# Patient Record
Sex: MALE | Race: WHITE | Employment: OTHER | URBAN - METROPOLITAN AREA
[De-identification: names, ages, dates, MRNs, and addresses within clinical notes are randomized per-mention and may not be internally consistent; named-entity substitution may affect disease eponyms.]

---

## 2017-08-29 ENCOUNTER — TELEPHONE (OUTPATIENT)
Dept: PHARMACY | Age: 82
End: 2017-08-29

## 2017-08-31 ENCOUNTER — HOSPITAL ENCOUNTER (OUTPATIENT)
Age: 82
Discharge: HOME | End: 2017-08-31
Payer: COMMERCIAL

## 2017-08-31 LAB
ABSOLUTE EOS #: 0.2 K/UL (ref 0–0.4)
ABSOLUTE LYMPH #: 1.4 K/UL (ref 1–4.8)
ABSOLUTE MONO #: 0.3 K/UL (ref 0.2–0.8)
ALBUMIN SERPL-MCNC: 3.6 G/DL (ref 3.5–5.2)
ANION GAP SERPL CALCULATED.3IONS-SCNC: 10 MMOL/L (ref 9–17)
BASOPHILS # BLD: 0 %
BASOPHILS ABSOLUTE: 0 K/UL (ref 0–0.2)
BILIRUBIN URINE: NEGATIVE
BUN BLDV-MCNC: 39 MG/DL (ref 8–23)
CALCIUM IONIZED: 1.26 MMOL/L (ref 1.13–1.33)
CALCIUM SERPL-MCNC: 9 MG/DL (ref 8.6–10.4)
CHLORIDE BLD-SCNC: 104 MMOL/L (ref 98–107)
CO2: 28 MMOL/L (ref 20–31)
COLOR: YELLOW
COMMENT UA: NORMAL
CREAT SERPL-MCNC: 1.93 MG/DL (ref 0.7–1.2)
CREATININE URINE: 71.1 MG/DL (ref 39–259)
DIFFERENTIAL TYPE: ABNORMAL
EOSINOPHILS RELATIVE PERCENT: 3 %
FERRITIN: 457 UG/L (ref 30–400)
GFR AFRICAN AMERICAN: 40 ML/MIN
GFR NON-AFRICAN AMERICAN: 33 ML/MIN
GFR SERPL CREATININE-BSD FRML MDRD: ABNORMAL ML/MIN/{1.73_M2}
GFR SERPL CREATININE-BSD FRML MDRD: ABNORMAL ML/MIN/{1.73_M2}
GLUCOSE URINE: NEGATIVE
HCT VFR BLD CALC: 32.8 % (ref 41–53)
HCT VFR BLD CALC: 33.1 % (ref 41–53)
HEMOGLOBIN: 10.8 G/DL (ref 13.5–17.5)
HEMOGLOBIN: 11 G/DL (ref 13.5–17.5)
IRON SATURATION: 30 % (ref 20–55)
IRON SATURATION: 30 % (ref 20–55)
IRON: 55 UG/DL (ref 59–158)
IRON: 55 UG/DL (ref 59–158)
KETONES, URINE: NEGATIVE
LEUKOCYTE ESTERASE, URINE: NEGATIVE
LYMPHOCYTES # BLD: 25 %
MAGNESIUM: 1.8 MG/DL (ref 1.6–2.6)
MCH RBC QN AUTO: 30.4 PG (ref 26–34)
MCHC RBC AUTO-ENTMCNC: 32.8 G/DL (ref 31–37)
MCV RBC AUTO: 92.9 FL (ref 80–100)
MONOCYTES # BLD: 6 %
NITRITE, URINE: NEGATIVE
PDW BLD-RTO: 17.8 % (ref 11.5–14.5)
PH UA: 5.5 (ref 5–8)
PHOSPHORUS: 3.2 MG/DL (ref 2.5–4.5)
PLATELET # BLD: 138 K/UL (ref 130–400)
PLATELET ESTIMATE: ABNORMAL
PMV BLD AUTO: ABNORMAL FL (ref 6–12)
POTASSIUM SERPL-SCNC: 5.2 MMOL/L (ref 3.7–5.3)
PROTEIN UA: NEGATIVE
PTH INTACT: 51.38 PG/ML (ref 15–65)
RBC # BLD: 3.56 M/UL (ref 4.5–5.9)
RBC # BLD: ABNORMAL 10*6/UL
SEG NEUTROPHILS: 66 %
SEGMENTED NEUTROPHILS ABSOLUTE COUNT: 3.9 K/UL (ref 1.8–7.7)
SODIUM BLD-SCNC: 142 MMOL/L (ref 135–144)
SPECIFIC GRAVITY UA: 1.01 (ref 1–1.03)
T3 TOTAL: 88 NG/DL (ref 80–200)
T4 TOTAL: 4.4 UG/DL (ref 4.5–12)
TOTAL IRON BINDING CAPACITY: 183 UG/DL (ref 250–450)
TOTAL IRON BINDING CAPACITY: 183 UG/DL (ref 250–450)
TOTAL PROTEIN, URINE: 13 MG/DL
TSH SERPL DL<=0.05 MIU/L-ACNC: 4.87 MIU/L (ref 0.3–5)
TURBIDITY: CLEAR
UNSATURATED IRON BINDING CAPACITY: 128 UG/DL (ref 112–347)
UNSATURATED IRON BINDING CAPACITY: 128 UG/DL (ref 112–347)
URINE HGB: NEGATIVE
UROBILINOGEN, URINE: NORMAL
VITAMIN D 25-HYDROXY: 19.5 NG/ML (ref 30–100)
WBC # BLD: 5.8 K/UL (ref 3.5–11)
WBC # BLD: ABNORMAL 10*3/UL

## 2017-08-31 PROCEDURE — 82330 ASSAY OF CALCIUM: CPT

## 2017-08-31 PROCEDURE — 82728 ASSAY OF FERRITIN: CPT

## 2017-08-31 PROCEDURE — 83540 ASSAY OF IRON: CPT

## 2017-08-31 PROCEDURE — 36415 COLL VENOUS BLD VENIPUNCTURE: CPT

## 2017-08-31 PROCEDURE — 84480 ASSAY TRIIODOTHYRONINE (T3): CPT

## 2017-08-31 PROCEDURE — 84443 ASSAY THYROID STIM HORMONE: CPT

## 2017-08-31 PROCEDURE — 82040 ASSAY OF SERUM ALBUMIN: CPT

## 2017-08-31 PROCEDURE — 82306 VITAMIN D 25 HYDROXY: CPT

## 2017-08-31 PROCEDURE — 85025 COMPLETE CBC W/AUTO DIFF WBC: CPT

## 2017-08-31 PROCEDURE — 82565 ASSAY OF CREATININE: CPT

## 2017-08-31 PROCEDURE — 84100 ASSAY OF PHOSPHORUS: CPT

## 2017-08-31 PROCEDURE — 80051 ELECTROLYTE PANEL: CPT

## 2017-08-31 PROCEDURE — 83735 ASSAY OF MAGNESIUM: CPT

## 2017-08-31 PROCEDURE — 82310 ASSAY OF CALCIUM: CPT

## 2017-08-31 PROCEDURE — 85014 HEMATOCRIT: CPT

## 2017-08-31 PROCEDURE — 84520 ASSAY OF UREA NITROGEN: CPT

## 2017-08-31 PROCEDURE — 83550 IRON BINDING TEST: CPT

## 2017-08-31 PROCEDURE — 82570 ASSAY OF URINE CREATININE: CPT

## 2017-08-31 PROCEDURE — 81003 URINALYSIS AUTO W/O SCOPE: CPT

## 2017-08-31 PROCEDURE — 85018 HEMOGLOBIN: CPT

## 2017-08-31 PROCEDURE — 84156 ASSAY OF PROTEIN URINE: CPT

## 2017-08-31 PROCEDURE — 84436 ASSAY OF TOTAL THYROXINE: CPT

## 2017-08-31 PROCEDURE — 83970 ASSAY OF PARATHORMONE: CPT

## 2017-09-06 LAB — INR BLD: 1.9

## 2017-09-20 ENCOUNTER — TELEPHONE (OUTPATIENT)
Dept: PHARMACY | Age: 82
End: 2017-09-20

## 2017-09-28 ENCOUNTER — HOSPITAL ENCOUNTER (OUTPATIENT)
Dept: PHARMACY | Age: 82
Setting detail: THERAPIES SERIES
Discharge: HOME | End: 2017-09-28
Payer: COMMERCIAL

## 2017-09-28 DIAGNOSIS — I48.91 ATRIAL FIBRILLATION, UNSPECIFIED TYPE (HCC): ICD-10-CM

## 2017-09-28 DIAGNOSIS — Z95.2 MECHANICAL HEART VALVE PRESENT: ICD-10-CM

## 2017-09-28 PROBLEM — Z95.1 HISTORY OF CORONARY ARTERY BYPASS SURGERY: Status: ACTIVE | Noted: 2017-09-05

## 2017-09-28 PROBLEM — I21.3 ST ELEVATION MYOCARDIAL INFARCTION (STEMI) (HCC): Status: ACTIVE | Noted: 2017-08-01

## 2017-09-28 LAB
INR BLD: 1.5
PROTIME: 18.1 SECONDS

## 2017-09-28 PROCEDURE — 85610 PROTHROMBIN TIME: CPT

## 2017-09-28 PROCEDURE — 99211 OFF/OP EST MAY X REQ PHY/QHP: CPT

## 2017-09-28 RX ORDER — ACETAMINOPHEN 325 MG/1
650 TABLET ORAL EVERY 4 HOURS PRN
COMMUNITY

## 2017-09-28 RX ORDER — M-VIT,TX,IRON,MINS/CALC/FOLIC 27MG-0.4MG
1 TABLET ORAL DAILY
COMMUNITY

## 2017-09-28 RX ORDER — TAMSULOSIN HYDROCHLORIDE 0.4 MG/1
0.4 CAPSULE ORAL DAILY
COMMUNITY

## 2017-09-28 RX ORDER — SIMVASTATIN 40 MG
40 TABLET ORAL NIGHTLY
COMMUNITY

## 2017-09-28 RX ORDER — POTASSIUM CHLORIDE 750 MG/1
20 TABLET, FILM COATED, EXTENDED RELEASE ORAL DAILY
COMMUNITY

## 2017-09-28 RX ORDER — METOPROLOL SUCCINATE 25 MG/1
25 TABLET, EXTENDED RELEASE ORAL DAILY
COMMUNITY

## 2017-09-28 RX ORDER — ALLOPURINOL 300 MG/1
300 TABLET ORAL DAILY
COMMUNITY

## 2017-09-28 RX ORDER — OMEPRAZOLE 20 MG/1
20 CAPSULE, DELAYED RELEASE ORAL DAILY
COMMUNITY

## 2017-09-28 RX ORDER — WARFARIN SODIUM 3 MG/1
3-4.5 TABLET ORAL SEE ADMIN INSTRUCTIONS
COMMUNITY
End: 2017-11-08 | Stop reason: SDUPTHER

## 2017-09-28 RX ORDER — BUMETANIDE 0.5 MG/1
0.5 TABLET ORAL DAILY
COMMUNITY

## 2017-09-28 RX ORDER — MIRTAZAPINE 15 MG/1
15 TABLET, FILM COATED ORAL NIGHTLY
COMMUNITY

## 2017-10-05 ENCOUNTER — HOSPITAL ENCOUNTER (OUTPATIENT)
Dept: PHARMACY | Age: 82
Setting detail: THERAPIES SERIES
Discharge: HOME | End: 2017-10-05
Payer: COMMERCIAL

## 2017-10-05 LAB
INR BLD: 1.2
PROTIME: 14.8 SECONDS

## 2017-10-05 PROCEDURE — 99211 OFF/OP EST MAY X REQ PHY/QHP: CPT

## 2017-10-05 PROCEDURE — 85610 PROTHROMBIN TIME: CPT

## 2017-10-05 NOTE — IP AVS SNAPSHOT
After Visit Summary             Ysabel Dave   10/5/2017  9:00 AM   Anti-Coag    Description:  Male : 1933   Provider:  Olga ENRIQUE   Department:  [facility]ication [facility]              Your Follow-Up and Future Appointments         Below is a list of your follow-up and future appointments. This may not be a complete list as you may have made appointments directly with providers that we are not aware of or your providers may have made some for you. Please call your providers to confirm appointments. It is important to keep your appointments. Please bring your current insurance card, photo ID, co-pay, and all medication bottles to your appointment. If self-pay, payment is expected at the time of service. Your To-Do List     Future Appointments Provider Department Dept Phone    10/12/2017 9:45 AM [address] Medication Management [phone]         Information from Your Visit        Department     Name Address Phone Fax    [facility] [address] [facility] [address] [phone]      Anticoagulation Summary as of 10/5/2017              Today's INR 1. 2!     Next INR check 10/12/2017         Medications and Orders      Your Current Medications Are              acetaminophen (TYLENOL) 325 MG tablet Take 650 mg by mouth every 4 hours as needed for Pain or Fever    allopurinol (ZYLOPRIM) 300 MG tablet Take 300 mg by mouth daily    aspirin 81 MG tablet Take 81 mg by mouth daily    bumetanide (BUMEX) 0.5 MG tablet Take 0.5 mg by mouth daily    warfarin (COUMADIN) 3 MG tablet Take 3-4.5 mg by mouth See Admin Instructions Indications: pt takes 4.5 mg on Mon/Th and 3 mg all other days    tamsulosin (FLOMAX) 0.4 MG capsule Take 0.4 mg by mouth daily    Nutritional Supplements (BELKYS NUTRIVIGOR PO) Take 1 packet by mouth 2 times daily 29               30               31                    Date Details   10/05 This INR check       Date of next INR:  10/12/2017         How to take your warfarin dose              To take:  3 mg Take 1 of the 3 mg tablets. To take:  4.5 mg Take 1.5 of the 3 mg tablets.

## 2017-10-12 ENCOUNTER — HOSPITAL ENCOUNTER (OUTPATIENT)
Dept: PHARMACY | Age: 82
Setting detail: THERAPIES SERIES
Discharge: HOME | End: 2017-10-12
Payer: COMMERCIAL

## 2017-10-12 PROBLEM — Z95.3 BIOPROSTHETIC AORTIC VALVE REPLACEMENT DURING CURRENT HOSPITALIZATION: Status: ACTIVE | Noted: 2017-10-12

## 2017-10-12 LAB
INR BLD: 1.2
PROTIME: 14.2 SECONDS

## 2017-10-12 PROCEDURE — 85610 PROTHROMBIN TIME: CPT

## 2017-10-12 PROCEDURE — 99211 OFF/OP EST MAY X REQ PHY/QHP: CPT

## 2017-10-12 NOTE — PROGRESS NOTES
Patient states he is taking his medications in his med packs but cannot tell me any more specific information. He is alone at this visit; no family/caregiver present to provide any additional information. No bleeding or thromboembolic side effects noted. No significant dietary changes. No significant recent illness or disease state changes. PT/INR done in office per protocol. INR is 1.2 which is subtherapeutic and the same as last week's visit despite the noted increase in regimen which makes me question whether he is taking this medication as instructed. Tried calling daughter and spouse in an attempt to gain more information, but I had to leave messages at both numbers. Warfarin regimen will be increased to 6mg Tues/Thurs/Sat, 3mg all other days. Will retest in 6 days. I tried to explain dosing information and directions, but I don't think he understands. Dosing schedule and follow up appointment given to patient; also left dosing information in my message with the daughter as he requested. Progress note routed to referring physicians office.

## 2017-10-18 ENCOUNTER — HOSPITAL ENCOUNTER (OUTPATIENT)
Dept: PHARMACY | Age: 82
Setting detail: THERAPIES SERIES
Discharge: HOME | End: 2017-10-18
Payer: COMMERCIAL

## 2017-10-18 LAB
INR BLD: 1.2
PROTIME: 14.7 SECONDS

## 2017-10-18 PROCEDURE — 99211 OFF/OP EST MAY X REQ PHY/QHP: CPT

## 2017-10-18 PROCEDURE — 85610 PROTHROMBIN TIME: CPT

## 2017-10-18 NOTE — PROGRESS NOTES
Patient states he is taking his pills in his medication pack. He was asked by the clinic to bring in his medication packs today, but he brought in only a partial pack from home--it only contained the bedtime doses from 10/5 through 10/10. Warfarin was still in this pack so it appears he did not take his warfarin during that week, which would explain the low INR at last visit. His warfarin was previously managed at [facility] clinic where he was therapeutic at 3mg daily when his wife was taking care of his medications. He no longer lives with her, and he does not seem to be able to take care of his meds himself. He is currently living at a friend's house [place], [place] [phone]) and his sister Jessica Landrum lives next door ( [phone]). He does not have a cell phone a this time, so he can only be contacted through his friend or sister. No bleeding or thromboembolic side effects noted. No significant med or dietary changes. No significant recent illness or disease state changes. PT/INR done in office per protocol. INR is 1.2 which is subtherapeutic as I suspect he is not taking his warfarin. I contacted [facility] where he gets his medications packaged, and they state they are delivering a new med pack today but will leave his warfarin in a vial for us to insert into the packs. I left a message with his roommate to have him bring his med packs and warfarin vial back to the clinic today or tomorrow so that we can insert the correct warfarin dose. Warfarin regimen will be continued with 6mg x 2, then 3mg daily. Will retest in 1 week. Dosing schedule and follow up appointment given to patient. Progress note routed to referring physicians office.

## 2017-10-23 ENCOUNTER — TELEPHONE (OUTPATIENT)
Dept: PHARMACY | Age: 82
End: 2017-10-23

## 2017-10-23 NOTE — TELEPHONE ENCOUNTER
Pt did not come to clinic last Thursday as requested so that we could put his correct warfarin dose into his medication bubblepack.  Called pt, spoke with his friend/roommate Tico Burch who will tell him to come in tomorrow at 8am.

## 2017-10-24 ENCOUNTER — HOSPITAL ENCOUNTER (OUTPATIENT)
Dept: PHARMACY | Age: 82
Setting detail: THERAPIES SERIES
Discharge: HOME | End: 2017-10-24
Payer: COMMERCIAL

## 2017-10-24 DIAGNOSIS — Z95.3 BIOPROSTHETIC AORTIC VALVE REPLACEMENT DURING CURRENT HOSPITALIZATION: ICD-10-CM

## 2017-10-24 DIAGNOSIS — I48.91 ATRIAL FIBRILLATION, UNSPECIFIED TYPE (HCC): ICD-10-CM

## 2017-10-24 LAB
INR BLD: 1.2
PROTIME: 14 SECONDS

## 2017-10-24 PROCEDURE — 85610 PROTHROMBIN TIME: CPT

## 2017-10-24 PROCEDURE — 99211 OFF/OP EST MAY X REQ PHY/QHP: CPT

## 2017-10-25 ENCOUNTER — APPOINTMENT (OUTPATIENT)
Dept: PHARMACY | Age: 82
End: 2017-10-25
Payer: COMMERCIAL

## 2017-10-30 ENCOUNTER — APPOINTMENT (OUTPATIENT)
Dept: PHARMACY | Age: 82
End: 2017-10-30
Payer: COMMERCIAL

## 2017-10-31 ENCOUNTER — HOSPITAL ENCOUNTER (OUTPATIENT)
Dept: PHARMACY | Age: 82
Setting detail: THERAPIES SERIES
Discharge: HOME | End: 2017-10-31
Payer: COMMERCIAL

## 2017-10-31 DIAGNOSIS — Z95.3 BIOPROSTHETIC AORTIC VALVE REPLACEMENT DURING CURRENT HOSPITALIZATION: ICD-10-CM

## 2017-10-31 LAB
INR BLD: 1.6
PROTIME: 19.7 SECONDS

## 2017-10-31 PROCEDURE — 85610 PROTHROMBIN TIME: CPT

## 2017-10-31 PROCEDURE — 99211 OFF/OP EST MAY X REQ PHY/QHP: CPT

## 2017-10-31 NOTE — PROGRESS NOTES
Patient states compliant all of the time with regimen. No bleeding or thromboembolic side effects noted. No significant med or dietary changes. No significant recent illness or disease state changes. PT/INR done in office per protocol. INR is 1.6 which is subtherapeutic. Warfarin regimen will be increased to 6mg x 1 then continued at 3mg daily. Will retest in 1 week. Patent's med packs from [facility] were refilled with warfarin thru 11/7 at bedtime. Patient understands dosing directions and information discussed. Dosing schedule and follow up appointment given to patient. Progress note routed to referring physicians office.

## 2017-11-08 ENCOUNTER — HOSPITAL ENCOUNTER (OUTPATIENT)
Dept: PHARMACY | Age: 82
Setting detail: THERAPIES SERIES
Discharge: HOME | End: 2017-11-08
Payer: COMMERCIAL

## 2017-11-08 DIAGNOSIS — Z95.3 BIOPROSTHETIC AORTIC VALVE REPLACEMENT DURING CURRENT HOSPITALIZATION: ICD-10-CM

## 2017-11-08 DIAGNOSIS — I48.91 ATRIAL FIBRILLATION, UNSPECIFIED TYPE (HCC): ICD-10-CM

## 2017-11-08 LAB
INR BLD: 1.5
PROTIME: 18.6 SECONDS

## 2017-11-08 PROCEDURE — 85610 PROTHROMBIN TIME: CPT

## 2017-11-08 PROCEDURE — 99211 OFF/OP EST MAY X REQ PHY/QHP: CPT

## 2017-11-08 RX ORDER — WARFARIN SODIUM 3 MG/1
3-6 TABLET ORAL DAILY
Qty: 60 TABLET | Refills: 1 | OUTPATIENT
Start: 2017-11-08 | End: 2018-03-07 | Stop reason: SDUPTHER

## 2017-11-13 ENCOUNTER — HOSPITAL ENCOUNTER (OUTPATIENT)
Dept: PHARMACY | Age: 82
Setting detail: THERAPIES SERIES
Discharge: HOME | End: 2017-11-13
Payer: COMMERCIAL

## 2017-11-13 DIAGNOSIS — I48.91 ATRIAL FIBRILLATION, UNSPECIFIED TYPE (HCC): ICD-10-CM

## 2017-11-13 DIAGNOSIS — Z95.3 BIOPROSTHETIC AORTIC VALVE REPLACEMENT DURING CURRENT HOSPITALIZATION: ICD-10-CM

## 2017-11-13 LAB
INR BLD: 3.2
PROTIME: 38.9 SECONDS

## 2017-11-13 PROCEDURE — 99211 OFF/OP EST MAY X REQ PHY/QHP: CPT

## 2017-11-13 PROCEDURE — 85610 PROTHROMBIN TIME: CPT

## 2017-11-13 NOTE — PROGRESS NOTES
Patient states he took all the pills in his pill packs. Patient DID NOT bring new pill packs for us to put warfarin in. No bleeding or thromboembolic side effects noted. No significant med or dietary changes. No significant recent illness or disease state changes. PT/INR done in office per protocol. INR is 3.2 which is supratherapeutic. Warfarin regimen will be continued with 3mg daily. Patient reminded to bring pill packs to next visit to help with compliance. Gave dose card and calendar print out to help today. Will retest in 1 week. Patient understands dosing directions and information discussed. Dosing schedule and follow up appointment given to patient. Progress note routed to referring physicians office.

## 2017-11-20 ENCOUNTER — HOSPITAL ENCOUNTER (OUTPATIENT)
Dept: PHARMACY | Age: 82
Setting detail: THERAPIES SERIES
Discharge: HOME | End: 2017-11-20
Payer: COMMERCIAL

## 2017-11-20 DIAGNOSIS — Z95.3 BIOPROSTHETIC AORTIC VALVE REPLACEMENT DURING CURRENT HOSPITALIZATION: ICD-10-CM

## 2017-11-20 DIAGNOSIS — I48.91 ATRIAL FIBRILLATION, UNSPECIFIED TYPE (HCC): ICD-10-CM

## 2017-11-20 LAB
INR BLD: 1.9
PROTIME: 22.4 SECONDS

## 2017-11-20 PROCEDURE — 85610 PROTHROMBIN TIME: CPT

## 2017-11-20 PROCEDURE — 99211 OFF/OP EST MAY X REQ PHY/QHP: CPT

## 2017-11-21 ENCOUNTER — TELEPHONE (OUTPATIENT)
Dept: PHARMACY | Age: 82
End: 2017-11-21

## 2017-11-21 DIAGNOSIS — I48.91 ATRIAL FIBRILLATION, UNSPECIFIED TYPE (HCC): ICD-10-CM

## 2017-11-21 DIAGNOSIS — Z95.3 BIOPROSTHETIC AORTIC VALVE REPLACEMENT DURING CURRENT HOSPITALIZATION: ICD-10-CM

## 2017-11-27 ENCOUNTER — HOSPITAL ENCOUNTER (OUTPATIENT)
Dept: PHARMACY | Age: 82
Setting detail: THERAPIES SERIES
Discharge: HOME | End: 2017-11-27
Payer: COMMERCIAL

## 2017-11-27 DIAGNOSIS — I48.91 ATRIAL FIBRILLATION, UNSPECIFIED TYPE (HCC): ICD-10-CM

## 2017-11-27 DIAGNOSIS — Z95.3 BIOPROSTHETIC AORTIC VALVE REPLACEMENT DURING CURRENT HOSPITALIZATION: ICD-10-CM

## 2017-11-27 LAB
INR BLD: 1.3
PROTIME: 15 SECONDS

## 2017-11-27 PROCEDURE — 99211 OFF/OP EST MAY X REQ PHY/QHP: CPT

## 2017-11-27 PROCEDURE — 85610 PROTHROMBIN TIME: CPT

## 2017-11-27 NOTE — PROGRESS NOTES
Patient states he took only 3mg daily. No bleeding or thromboembolic side effects noted. No significant med or dietary changes. No significant recent illness or disease state changes. PT/INR done in office per protocol. INR is 1.3 which is subtherapeutic likely from compliance. Warfarin regimen will be continued with intended dose target. Printed extra dose chart along with dosing card to aid compliance. Will retest in 1 week. Patient understands dosing directions and information discussed. Dosing schedule and follow up appointment given to patient. Progress note routed to referring physicians office.

## 2017-12-04 ENCOUNTER — APPOINTMENT (OUTPATIENT)
Dept: PHARMACY | Age: 82
End: 2017-12-04
Payer: COMMERCIAL

## 2017-12-04 ENCOUNTER — TELEPHONE (OUTPATIENT)
Dept: PHARMACY | Age: 82
End: 2017-12-04

## 2017-12-04 DIAGNOSIS — Z95.3 BIOPROSTHETIC AORTIC VALVE REPLACEMENT DURING CURRENT HOSPITALIZATION: ICD-10-CM

## 2017-12-04 DIAGNOSIS — I48.91 ATRIAL FIBRILLATION, UNSPECIFIED TYPE (HCC): ICD-10-CM

## 2017-12-04 NOTE — TELEPHONE ENCOUNTER
Patient apparently showed up for 1:45 appointment at noon when clinic was closed for lunch. Spoke with  and said he couldn't wait because he was going to [place] to catch a flight.   Patient will need to reschedule

## 2017-12-06 ENCOUNTER — HOSPITAL ENCOUNTER (OUTPATIENT)
Dept: PHARMACY | Age: 82
Setting detail: THERAPIES SERIES
Discharge: HOME | End: 2017-12-06
Payer: COMMERCIAL

## 2017-12-06 DIAGNOSIS — I48.91 ATRIAL FIBRILLATION, UNSPECIFIED TYPE (HCC): ICD-10-CM

## 2017-12-06 DIAGNOSIS — Z95.3 BIOPROSTHETIC AORTIC VALVE REPLACEMENT DURING CURRENT HOSPITALIZATION: ICD-10-CM

## 2017-12-06 LAB
INR BLD: 1.3
PROTIME: 15.4 SECONDS

## 2017-12-06 PROCEDURE — 85610 PROTHROMBIN TIME: CPT

## 2017-12-06 PROCEDURE — 99211 OFF/OP EST MAY X REQ PHY/QHP: CPT

## 2017-12-06 NOTE — PROGRESS NOTES
Patient states compliant all of the time with regimen. Clearly this is not the case. Patient is very confused. Marquita Sutherland came to appointment today with patient and states she will help him with his pills this week but would like to see him more independent in managing his medications. I again advised patient to bring in his pill packs and we can add the correct dose of warfarin to the packs. He plans to bring next week. No bleeding or thromboembolic side effects noted. No significant med or dietary changes. No significant recent illness or disease state changes. PT/INR done in office per protocol. INR is 1.3 which is subtherapeutic. Warfarin regimen will be continued with 6mg W/F/Sun and 3mg AOD. Will retest in 1 week. Patient understands dosing directions and information discussed. Dosing schedule and follow up appointment given to patient. Progress note routed to referring physicians office. Patient acknowledges working in consult agreement with pharmacist as referred by his/her physician.

## 2017-12-13 ENCOUNTER — HOSPITAL ENCOUNTER (OUTPATIENT)
Dept: PHARMACY | Age: 82
Setting detail: THERAPIES SERIES
Discharge: HOME | End: 2017-12-13
Payer: COMMERCIAL

## 2017-12-13 DIAGNOSIS — I48.91 ATRIAL FIBRILLATION, UNSPECIFIED TYPE (HCC): ICD-10-CM

## 2017-12-13 DIAGNOSIS — Z95.3 BIOPROSTHETIC AORTIC VALVE REPLACEMENT DURING CURRENT HOSPITALIZATION: ICD-10-CM

## 2017-12-13 LAB
INR BLD: 2
PROTIME: 24 SECONDS

## 2017-12-13 PROCEDURE — 99211 OFF/OP EST MAY X REQ PHY/QHP: CPT

## 2017-12-13 PROCEDURE — 85610 PROTHROMBIN TIME: CPT

## 2017-12-13 NOTE — PROGRESS NOTES
Patient states compliant all of the time with regimen. No bleeding or thromboembolic side effects noted. No significant med or dietary changes. No significant recent illness or disease state changes. PT/INR done in office per protocol. INR is 2.0 which is therapeutic. Warfarin regimen will be continued at current dose 6mg W/F/Sun and 3mg AOD. I put the tablets in patients pill packs through 12/19. Patient is aware to bring new sheet of pill packs to next appointment for warfarin to be added. Will retest in 1 week. Patient understands dosing directions and information discussed. Dosing schedule and follow up appointment given to patient. Progress note routed to referring physicians office. Patient acknowledges working in consult agreement with pharmacist as referred by his/her physician.

## 2017-12-20 ENCOUNTER — HOSPITAL ENCOUNTER (OUTPATIENT)
Dept: PHARMACY | Age: 82
Setting detail: THERAPIES SERIES
Discharge: HOME | End: 2017-12-20
Payer: COMMERCIAL

## 2017-12-20 DIAGNOSIS — I48.91 ATRIAL FIBRILLATION, UNSPECIFIED TYPE (HCC): ICD-10-CM

## 2017-12-20 DIAGNOSIS — Z95.3 BIOPROSTHETIC AORTIC VALVE REPLACEMENT DURING CURRENT HOSPITALIZATION: ICD-10-CM

## 2017-12-20 LAB
INR BLD: 2.7
PROTIME: 32.2 SECONDS

## 2017-12-20 PROCEDURE — 85610 PROTHROMBIN TIME: CPT

## 2017-12-20 PROCEDURE — 99211 OFF/OP EST MAY X REQ PHY/QHP: CPT

## 2017-12-20 NOTE — PROGRESS NOTES
Patient states he took all his meds in his pill packs. No bleeding or thromboembolic side effects noted. No significant med or dietary changes. No significant recent illness or disease state changes. PT/INR done in office per protocol. INR is 2.7 which is therapeutic. Warfarin regimen will be continued at current dose. I added the appropriate dose to patient pill packs through January 2, 2018. I also called in a refill to [facility] to be filled before next visit. Will retest in 2 weeks on 1/3/18. Patient understands dosing directions and information discussed. Dosing schedule and follow up appointment given to patient. Progress note routed to referring physicians office. Patient acknowledges working in consult agreement with pharmacist as referred by his/her physician.

## 2018-01-02 ENCOUNTER — HOSPITAL ENCOUNTER (OUTPATIENT)
Dept: PHARMACY | Age: 83
Setting detail: THERAPIES SERIES
Discharge: HOME | End: 2018-01-02
Payer: COMMERCIAL

## 2018-01-02 DIAGNOSIS — I48.91 ATRIAL FIBRILLATION, UNSPECIFIED TYPE (HCC): ICD-10-CM

## 2018-01-02 DIAGNOSIS — Z95.3 BIOPROSTHETIC AORTIC VALVE REPLACEMENT DURING CURRENT HOSPITALIZATION: ICD-10-CM

## 2018-01-02 LAB
INR BLD: 4.5
PROTIME: 54.5 SECONDS

## 2018-01-02 PROCEDURE — 85610 PROTHROMBIN TIME: CPT

## 2018-01-02 PROCEDURE — 99211 OFF/OP EST MAY X REQ PHY/QHP: CPT

## 2018-01-03 ENCOUNTER — APPOINTMENT (OUTPATIENT)
Dept: PHARMACY | Age: 83
End: 2018-01-03
Payer: COMMERCIAL

## 2018-01-09 ENCOUNTER — APPOINTMENT (OUTPATIENT)
Dept: PHARMACY | Age: 83
End: 2018-01-09
Payer: COMMERCIAL

## 2018-01-09 ENCOUNTER — TELEPHONE (OUTPATIENT)
Dept: PHARMACY | Age: 83
End: 2018-01-09

## 2018-01-09 NOTE — TELEPHONE ENCOUNTER
Patient was a no call/no show for today INR appointment today. Called spouse and she will call clinic back to reschedule appt.

## 2018-01-10 ENCOUNTER — HOSPITAL ENCOUNTER (OUTPATIENT)
Dept: PHARMACY | Age: 83
Setting detail: THERAPIES SERIES
Discharge: HOME | End: 2018-01-10
Payer: COMMERCIAL

## 2018-01-10 DIAGNOSIS — I48.91 ATRIAL FIBRILLATION, UNSPECIFIED TYPE (HCC): ICD-10-CM

## 2018-01-10 DIAGNOSIS — Z95.3 BIOPROSTHETIC AORTIC VALVE REPLACEMENT DURING CURRENT HOSPITALIZATION: ICD-10-CM

## 2018-01-10 LAB
INR BLD: 1.9
PROTIME: 23.3 SECONDS

## 2018-01-10 PROCEDURE — 99211 OFF/OP EST MAY X REQ PHY/QHP: CPT

## 2018-01-10 PROCEDURE — 85610 PROTHROMBIN TIME: CPT

## 2018-01-17 ENCOUNTER — TELEPHONE (OUTPATIENT)
Dept: PHARMACY | Age: 83
End: 2018-01-17

## 2018-01-17 ENCOUNTER — APPOINTMENT (OUTPATIENT)
Dept: PHARMACY | Age: 83
End: 2018-01-17
Payer: COMMERCIAL

## 2018-01-17 DIAGNOSIS — Z95.3 BIOPROSTHETIC AORTIC VALVE REPLACEMENT DURING CURRENT HOSPITALIZATION: ICD-10-CM

## 2018-01-17 DIAGNOSIS — I48.91 ATRIAL FIBRILLATION, UNSPECIFIED TYPE (HCC): ICD-10-CM

## 2018-01-23 NOTE — TELEPHONE ENCOUNTER
Adelaida Wheeler called back and set him up for an appt on Thurs at 11:45. He has not been taking meds since first of the year and when he picked up refill on pill brenden yesterday at [address] [place] [address] the pharmacist encouraged him to take 2 tabs of coumadin and get INR checked this week. She will encourage him to bring coumadin bottle and pill packs in so we can refill pack.

## 2018-01-25 ENCOUNTER — HOSPITAL ENCOUNTER (OUTPATIENT)
Dept: PHARMACY | Age: 83
Setting detail: THERAPIES SERIES
Discharge: HOME | End: 2018-01-25
Payer: COMMERCIAL

## 2018-01-25 DIAGNOSIS — Z95.3 BIOPROSTHETIC AORTIC VALVE REPLACEMENT DURING CURRENT HOSPITALIZATION: ICD-10-CM

## 2018-01-25 DIAGNOSIS — I48.91 ATRIAL FIBRILLATION, UNSPECIFIED TYPE (HCC): ICD-10-CM

## 2018-01-25 LAB
INR BLD: 1.3
PROTIME: 15.5 SECONDS

## 2018-01-25 PROCEDURE — 85610 PROTHROMBIN TIME: CPT

## 2018-01-25 PROCEDURE — 99211 OFF/OP EST MAY X REQ PHY/QHP: CPT

## 2018-01-25 NOTE — PROGRESS NOTES
Patient states he missed several days of doses (he took 6 mg on Mon and 3 mg on Tues and Wed). No bleeding or thromboembolic side effects noted. No significant med or dietary changes. No significant recent illness or disease state changes. PT/INR done in office per protocol. INR is 1.3 which is subtherapeutic, due to missed doses. Warfarin regimen will be 6 mg today and 6 mg on Sun and Wed then 3 mg all other days. (pill pack was dated until next week so he said he would take the doses up until his next appmt). Will retest in 1 week. Patient understands dosing directions and information discussed. Dosing schedule and follow up appointment given to patient. Progress note routed to referring physicians office. Patient acknowledges working in consult agreement with pharmacist as referred by his/her physician.

## 2018-01-31 ENCOUNTER — TELEPHONE (OUTPATIENT)
Dept: PHARMACY | Age: 83
End: 2018-01-31

## 2018-01-31 ENCOUNTER — HOSPITAL ENCOUNTER (OUTPATIENT)
Dept: PHARMACY | Age: 83
Setting detail: THERAPIES SERIES
Discharge: HOME | End: 2018-01-31
Payer: COMMERCIAL

## 2018-01-31 DIAGNOSIS — Z95.3 BIOPROSTHETIC AORTIC VALVE REPLACEMENT DURING CURRENT HOSPITALIZATION: ICD-10-CM

## 2018-01-31 DIAGNOSIS — I48.91 ATRIAL FIBRILLATION, UNSPECIFIED TYPE (HCC): ICD-10-CM

## 2018-02-08 ENCOUNTER — HOSPITAL ENCOUNTER (OUTPATIENT)
Dept: PHARMACY | Age: 83
Setting detail: THERAPIES SERIES
Discharge: HOME | End: 2018-02-08
Payer: COMMERCIAL

## 2018-02-08 DIAGNOSIS — Z95.3 BIOPROSTHETIC AORTIC VALVE REPLACEMENT DURING CURRENT HOSPITALIZATION: ICD-10-CM

## 2018-02-08 DIAGNOSIS — I48.91 ATRIAL FIBRILLATION, UNSPECIFIED TYPE (HCC): ICD-10-CM

## 2018-02-08 LAB
INR BLD: 2.2
PROTIME: 27 SECONDS

## 2018-02-08 PROCEDURE — 85610 PROTHROMBIN TIME: CPT

## 2018-02-08 PROCEDURE — 99211 OFF/OP EST MAY X REQ PHY/QHP: CPT

## 2018-02-22 ENCOUNTER — TELEPHONE (OUTPATIENT)
Dept: PHARMACY | Age: 83
End: 2018-02-22

## 2018-02-22 ENCOUNTER — APPOINTMENT (OUTPATIENT)
Dept: PHARMACY | Age: 83
End: 2018-02-22
Payer: COMMERCIAL

## 2018-02-22 DIAGNOSIS — I48.91 ATRIAL FIBRILLATION, UNSPECIFIED TYPE (HCC): ICD-10-CM

## 2018-02-22 DIAGNOSIS — Z95.3 BIOPROSTHETIC AORTIC VALVE REPLACEMENT DURING CURRENT HOSPITALIZATION: ICD-10-CM

## 2018-03-07 ENCOUNTER — HOSPITAL ENCOUNTER (OUTPATIENT)
Dept: PHARMACY | Age: 83
Setting detail: THERAPIES SERIES
Discharge: HOME | End: 2018-03-07
Payer: COMMERCIAL

## 2018-03-07 DIAGNOSIS — I48.91 ATRIAL FIBRILLATION, UNSPECIFIED TYPE (HCC): ICD-10-CM

## 2018-03-07 DIAGNOSIS — Z95.3 BIOPROSTHETIC AORTIC VALVE REPLACEMENT DURING CURRENT HOSPITALIZATION: ICD-10-CM

## 2018-03-07 LAB
INR BLD: 1.4
PROTIME: 16.3 SECONDS

## 2018-03-07 PROCEDURE — 99211 OFF/OP EST MAY X REQ PHY/QHP: CPT

## 2018-03-07 PROCEDURE — 85610 PROTHROMBIN TIME: CPT

## 2018-03-07 RX ORDER — WARFARIN SODIUM 3 MG/1
3-6 TABLET ORAL DAILY
Qty: 180 TABLET | Refills: 1 | OUTPATIENT
Start: 2018-03-07

## 2018-03-14 ENCOUNTER — APPOINTMENT (OUTPATIENT)
Dept: PHARMACY | Age: 83
End: 2018-03-14
Payer: COMMERCIAL

## 2018-03-14 ENCOUNTER — TELEPHONE (OUTPATIENT)
Dept: PHARMACY | Age: 83
End: 2018-03-14

## 2018-03-14 DIAGNOSIS — Z95.3 BIOPROSTHETIC AORTIC VALVE REPLACEMENT DURING CURRENT HOSPITALIZATION: ICD-10-CM

## 2018-03-14 DIAGNOSIS — I48.91 ATRIAL FIBRILLATION, UNSPECIFIED TYPE (HCC): ICD-10-CM

## 2018-03-15 ENCOUNTER — TELEPHONE (OUTPATIENT)
Dept: PHARMACY | Age: 83
End: 2018-03-15

## 2018-03-15 ENCOUNTER — HOSPITAL ENCOUNTER (OUTPATIENT)
Age: 83
Discharge: HOME | End: 2018-03-15
Payer: COMMERCIAL

## 2018-03-15 DIAGNOSIS — I48.91 ATRIAL FIBRILLATION, UNSPECIFIED TYPE (HCC): ICD-10-CM

## 2018-03-15 DIAGNOSIS — Z95.3 BIOPROSTHETIC AORTIC VALVE REPLACEMENT DURING CURRENT HOSPITALIZATION: ICD-10-CM

## 2018-03-15 LAB
ABSOLUTE EOS #: 0.1 K/UL (ref 0–0.4)
ABSOLUTE IMMATURE GRANULOCYTE: ABNORMAL K/UL (ref 0–0.3)
ABSOLUTE LYMPH #: 1.5 K/UL (ref 1–4.8)
ABSOLUTE MONO #: 0.5 K/UL (ref 0.2–0.8)
ANION GAP SERPL CALCULATED.3IONS-SCNC: 13 MMOL/L (ref 9–17)
BASOPHILS # BLD: 0 % (ref 0–2)
BASOPHILS ABSOLUTE: 0 K/UL (ref 0–0.2)
BILIRUBIN URINE: NEGATIVE
BUN BLDV-MCNC: 45 MG/DL (ref 8–23)
BUN/CREAT BLD: 19 (ref 9–20)
CALCIUM SERPL-MCNC: 8.8 MG/DL (ref 8.6–10.4)
CHLORIDE BLD-SCNC: 103 MMOL/L (ref 98–107)
CO2: 26 MMOL/L (ref 20–31)
COLOR: YELLOW
COMMENT UA: NORMAL
CREAT SERPL-MCNC: 2.4 MG/DL (ref 0.7–1.2)
DIFFERENTIAL TYPE: ABNORMAL
EOSINOPHILS RELATIVE PERCENT: 2 % (ref 1–4)
ESTIMATED AVERAGE GLUCOSE: 111 MG/DL
GFR AFRICAN AMERICAN: 31 ML/MIN
GFR NON-AFRICAN AMERICAN: 26 ML/MIN
GFR SERPL CREATININE-BSD FRML MDRD: ABNORMAL ML/MIN/{1.73_M2}
GFR SERPL CREATININE-BSD FRML MDRD: ABNORMAL ML/MIN/{1.73_M2}
GLUCOSE BLD-MCNC: 106 MG/DL (ref 70–99)
GLUCOSE URINE: NEGATIVE
HBA1C MFR BLD: 5.5 % (ref 4–6)
HCT VFR BLD CALC: 35.5 % (ref 41–53)
HEMOGLOBIN: 11.6 G/DL (ref 13.5–17.5)
IMMATURE GRANULOCYTES: ABNORMAL %
KETONES, URINE: NEGATIVE
LEUKOCYTE ESTERASE, URINE: NEGATIVE
LYMPHOCYTES # BLD: 27 % (ref 24–44)
MCH RBC QN AUTO: 31.5 PG (ref 26–34)
MCHC RBC AUTO-ENTMCNC: 32.6 G/DL (ref 31–37)
MCV RBC AUTO: 96.7 FL (ref 80–100)
MONOCYTES # BLD: 8 % (ref 1–7)
NITRITE, URINE: NEGATIVE
NRBC AUTOMATED: ABNORMAL PER 100 WBC
PDW BLD-RTO: 18.1 % (ref 11.5–14.5)
PH UA: 5.5 (ref 5–8)
PLATELET # BLD: 112 K/UL (ref 130–400)
PLATELET ESTIMATE: ABNORMAL
PMV BLD AUTO: 8.6 FL (ref 6–12)
POTASSIUM SERPL-SCNC: 4.6 MMOL/L (ref 3.7–5.3)
PROTEIN UA: NEGATIVE
RBC # BLD: 3.67 M/UL (ref 4.5–5.9)
RBC # BLD: ABNORMAL 10*6/UL
SEG NEUTROPHILS: 63 % (ref 36–66)
SEGMENTED NEUTROPHILS ABSOLUTE COUNT: 3.5 K/UL (ref 1.8–7.7)
SODIUM BLD-SCNC: 142 MMOL/L (ref 135–144)
SPECIFIC GRAVITY UA: 1.02 (ref 1–1.03)
TURBIDITY: CLEAR
URINE HGB: NEGATIVE
UROBILINOGEN, URINE: NORMAL
WBC # BLD: 5.5 K/UL (ref 3.5–11)
WBC # BLD: ABNORMAL 10*3/UL

## 2018-03-15 PROCEDURE — 85025 COMPLETE CBC W/AUTO DIFF WBC: CPT

## 2018-03-15 PROCEDURE — 83036 HEMOGLOBIN GLYCOSYLATED A1C: CPT

## 2018-03-15 PROCEDURE — 36415 COLL VENOUS BLD VENIPUNCTURE: CPT

## 2018-03-15 PROCEDURE — 87086 URINE CULTURE/COLONY COUNT: CPT

## 2018-03-15 PROCEDURE — 80048 BASIC METABOLIC PNL TOTAL CA: CPT

## 2018-03-15 PROCEDURE — 81003 URINALYSIS AUTO W/O SCOPE: CPT

## 2018-03-16 LAB
CULTURE: NORMAL
CULTURE: NORMAL
Lab: NORMAL
SPECIMEN DESCRIPTION: NORMAL
SPECIMEN DESCRIPTION: NORMAL
STATUS: NORMAL

## 2018-03-20 ENCOUNTER — APPOINTMENT (OUTPATIENT)
Dept: PHARMACY | Age: 83
End: 2018-03-20
Payer: COMMERCIAL

## 2018-03-20 ENCOUNTER — TELEPHONE (OUTPATIENT)
Dept: PHARMACY | Age: 83
End: 2018-03-20

## 2018-03-20 DIAGNOSIS — I48.91 ATRIAL FIBRILLATION, UNSPECIFIED TYPE (HCC): ICD-10-CM

## 2018-03-20 DIAGNOSIS — Z95.3 BIOPROSTHETIC AORTIC VALVE REPLACEMENT DURING CURRENT HOSPITALIZATION: ICD-10-CM

## 2018-03-20 NOTE — TELEPHONE ENCOUNTER
Mesha Sampson called to let us know Destiny Bruner has not taken any of his medications including his warfarin as instructed. Advised 6mg x 1 then resume 3mg daily. Patient was also have started CIPRO 500mg BID but has not done so. After questioning patient states he was given the Cipro after doing a urine test.  Advised he start ASAP and complete regimen as UTI may be contributing to his continued confusion and forgetfulness. Rescheduled INR test to 3/26.

## 2018-03-26 ENCOUNTER — HOSPITAL ENCOUNTER (OUTPATIENT)
Dept: PHARMACY | Age: 83
Setting detail: THERAPIES SERIES
Discharge: HOME | End: 2018-03-26
Payer: COMMERCIAL

## 2018-03-26 DIAGNOSIS — I48.91 ATRIAL FIBRILLATION, UNSPECIFIED TYPE (HCC): ICD-10-CM

## 2018-03-26 DIAGNOSIS — Z95.3 BIOPROSTHETIC AORTIC VALVE REPLACEMENT DURING CURRENT HOSPITALIZATION: ICD-10-CM

## 2018-03-26 LAB
INR BLD: 1.8
PROTIME: 22.1 SECONDS

## 2018-03-26 PROCEDURE — 99211 OFF/OP EST MAY X REQ PHY/QHP: CPT

## 2018-03-26 PROCEDURE — 85610 PROTHROMBIN TIME: CPT

## 2018-04-02 ENCOUNTER — HOSPITAL ENCOUNTER (OUTPATIENT)
Dept: PHARMACY | Age: 83
Setting detail: THERAPIES SERIES
Discharge: HOME | End: 2018-04-02
Payer: COMMERCIAL

## 2018-04-02 DIAGNOSIS — I48.91 ATRIAL FIBRILLATION, UNSPECIFIED TYPE (HCC): ICD-10-CM

## 2018-04-02 DIAGNOSIS — Z95.3 BIOPROSTHETIC AORTIC VALVE REPLACEMENT DURING CURRENT HOSPITALIZATION: ICD-10-CM

## 2018-04-02 LAB
INR BLD: 2.3
PROTIME: 27.4 SECONDS

## 2018-04-02 PROCEDURE — 85610 PROTHROMBIN TIME: CPT

## 2018-04-02 PROCEDURE — 99211 OFF/OP EST MAY X REQ PHY/QHP: CPT

## 2018-04-09 ENCOUNTER — HOSPITAL ENCOUNTER (OUTPATIENT)
Dept: PHARMACY | Age: 83
Setting detail: THERAPIES SERIES
Discharge: HOME | End: 2018-04-09
Payer: COMMERCIAL

## 2018-04-09 DIAGNOSIS — I48.91 ATRIAL FIBRILLATION, UNSPECIFIED TYPE (HCC): ICD-10-CM

## 2018-04-09 DIAGNOSIS — Z95.3 BIOPROSTHETIC AORTIC VALVE REPLACEMENT DURING CURRENT HOSPITALIZATION: ICD-10-CM

## 2018-04-09 LAB
INR BLD: 2
PROTIME: 23.7 SECONDS

## 2018-04-09 PROCEDURE — 99211 OFF/OP EST MAY X REQ PHY/QHP: CPT

## 2018-04-09 PROCEDURE — 85610 PROTHROMBIN TIME: CPT

## 2018-04-24 ENCOUNTER — HOSPITAL ENCOUNTER (OUTPATIENT)
Dept: PHARMACY | Age: 83
Setting detail: THERAPIES SERIES
Discharge: HOME | End: 2018-04-24
Payer: COMMERCIAL

## 2018-04-24 DIAGNOSIS — I48.91 ATRIAL FIBRILLATION, UNSPECIFIED TYPE (HCC): ICD-10-CM

## 2018-04-24 DIAGNOSIS — Z95.3 BIOPROSTHETIC AORTIC VALVE REPLACEMENT DURING CURRENT HOSPITALIZATION: ICD-10-CM

## 2018-04-24 LAB
INR BLD: 3
PROTIME: 35.9 SECONDS

## 2018-04-24 PROCEDURE — 85610 PROTHROMBIN TIME: CPT

## 2018-04-24 PROCEDURE — 99211 OFF/OP EST MAY X REQ PHY/QHP: CPT

## 2018-05-07 ENCOUNTER — APPOINTMENT (OUTPATIENT)
Dept: PHARMACY | Age: 83
End: 2018-05-07
Payer: COMMERCIAL

## 2018-05-07 ENCOUNTER — TELEPHONE (OUTPATIENT)
Dept: PHARMACY | Age: 83
End: 2018-05-07

## 2018-05-07 DIAGNOSIS — Z95.3 BIOPROSTHETIC AORTIC VALVE REPLACEMENT DURING CURRENT HOSPITALIZATION: ICD-10-CM

## 2018-05-07 DIAGNOSIS — I48.91 ATRIAL FIBRILLATION, UNSPECIFIED TYPE (HCC): ICD-10-CM

## 2018-05-17 ENCOUNTER — HOSPITAL ENCOUNTER (OUTPATIENT)
Dept: PHARMACY | Age: 83
Setting detail: THERAPIES SERIES
Discharge: HOME | End: 2018-05-17
Payer: COMMERCIAL

## 2018-05-17 DIAGNOSIS — I48.91 ATRIAL FIBRILLATION, UNSPECIFIED TYPE (HCC): ICD-10-CM

## 2018-05-17 DIAGNOSIS — Z95.3 BIOPROSTHETIC AORTIC VALVE REPLACEMENT DURING CURRENT HOSPITALIZATION: ICD-10-CM

## 2018-05-17 LAB
INR BLD: 1.4
PROTIME: 17 SECONDS

## 2018-05-17 PROCEDURE — 99211 OFF/OP EST MAY X REQ PHY/QHP: CPT

## 2018-05-17 PROCEDURE — 85610 PROTHROMBIN TIME: CPT

## 2018-05-24 ENCOUNTER — APPOINTMENT (OUTPATIENT)
Dept: PHARMACY | Age: 83
End: 2018-05-24
Payer: COMMERCIAL

## 2018-05-24 ENCOUNTER — TELEPHONE (OUTPATIENT)
Dept: PHARMACY | Age: 83
End: 2018-05-24

## 2018-05-24 DIAGNOSIS — I48.91 ATRIAL FIBRILLATION, UNSPECIFIED TYPE (HCC): ICD-10-CM

## 2018-05-24 DIAGNOSIS — Z95.3 BIOPROSTHETIC AORTIC VALVE REPLACEMENT DURING CURRENT HOSPITALIZATION: ICD-10-CM

## 2018-07-16 ENCOUNTER — TELEPHONE (OUTPATIENT)
Dept: PHARMACY | Age: 83
End: 2018-07-16

## 2018-07-16 DIAGNOSIS — Z95.3 BIOPROSTHETIC AORTIC VALVE REPLACEMENT DURING CURRENT HOSPITALIZATION: ICD-10-CM

## 2018-07-16 DIAGNOSIS — I48.91 ATRIAL FIBRILLATION, UNSPECIFIED TYPE (HCC): ICD-10-CM

## 2018-11-09 PROBLEM — N18.4 BENIGN HYPERTENSION WITH CKD (CHRONIC KIDNEY DISEASE) STAGE IV (HCC): Status: ACTIVE | Noted: 2018-11-09

## 2018-11-09 PROBLEM — N18.4 CKD (CHRONIC KIDNEY DISEASE) STAGE 4, GFR 15-29 ML/MIN (HCC): Status: ACTIVE | Noted: 2018-11-09

## 2018-11-09 PROBLEM — I12.9 BENIGN HYPERTENSION WITH CKD (CHRONIC KIDNEY DISEASE) STAGE IV (HCC): Status: ACTIVE | Noted: 2018-11-09

## 2018-12-28 ENCOUNTER — HOSPITAL ENCOUNTER (OUTPATIENT)
Age: 83
Setting detail: SPECIMEN
Discharge: HOME | End: 2018-12-28
Payer: COMMERCIAL

## 2018-12-28 LAB
ABSOLUTE EOS #: 0.07 K/UL (ref 0–0.4)
ABSOLUTE IMMATURE GRANULOCYTE: ABNORMAL K/UL (ref 0–0.3)
ABSOLUTE LYMPH #: 1.24 K/UL (ref 1–4.8)
ABSOLUTE MONO #: 0.22 K/UL (ref 0.2–0.8)
ANION GAP SERPL CALCULATED.3IONS-SCNC: 11 MMOL/L (ref 9–17)
ATYPICAL LYMPHOCYTE ABSOLUTE COUNT: 0.07 K/UL
ATYPICAL LYMPHOCYTES: 1 %
BASOPHILS # BLD: 0 %
BASOPHILS ABSOLUTE: 0 K/UL (ref 0–0.2)
BUN BLDV-MCNC: 33 MG/DL (ref 8–23)
BUN/CREAT BLD: 17 (ref 9–20)
CALCIUM SERPL-MCNC: 8.3 MG/DL (ref 8.6–10.4)
CHLORIDE BLD-SCNC: 103 MMOL/L (ref 98–107)
CO2: 26 MMOL/L (ref 20–31)
CREAT SERPL-MCNC: 1.89 MG/DL (ref 0.7–1.2)
DIFFERENTIAL TYPE: ABNORMAL
EOSINOPHILS RELATIVE PERCENT: 1 % (ref 1–4)
GFR AFRICAN AMERICAN: 41 ML/MIN
GFR NON-AFRICAN AMERICAN: 34 ML/MIN
GFR SERPL CREATININE-BSD FRML MDRD: ABNORMAL ML/MIN/{1.73_M2}
GFR SERPL CREATININE-BSD FRML MDRD: ABNORMAL ML/MIN/{1.73_M2}
GLUCOSE BLD-MCNC: 154 MG/DL (ref 70–99)
HCT VFR BLD CALC: 24.7 % (ref 41–53)
HEMOGLOBIN: 8.1 G/DL (ref 13.5–17.5)
IMMATURE GRANULOCYTES: ABNORMAL %
LYMPHOCYTES # BLD: 17 % (ref 24–44)
MCH RBC QN AUTO: 31.3 PG (ref 26–34)
MCHC RBC AUTO-ENTMCNC: 33 G/DL (ref 31–37)
MCV RBC AUTO: 94.9 FL (ref 80–100)
MONOCYTES # BLD: 3 % (ref 1–7)
MORPHOLOGY: ABNORMAL
NRBC AUTOMATED: ABNORMAL PER 100 WBC
PDW BLD-RTO: 19.1 % (ref 11.5–14.5)
PLATELET # BLD: 129 K/UL (ref 130–400)
PLATELET ESTIMATE: ABNORMAL
PMV BLD AUTO: ABNORMAL FL (ref 6–12)
POTASSIUM SERPL-SCNC: 4.6 MMOL/L (ref 3.7–5.3)
RBC # BLD: 2.6 M/UL (ref 4.5–5.9)
RBC # BLD: ABNORMAL 10*6/UL
SEG NEUTROPHILS: 78 % (ref 36–66)
SEGMENTED NEUTROPHILS ABSOLUTE COUNT: 5.7 K/UL (ref 1.8–7.7)
SODIUM BLD-SCNC: 140 MMOL/L (ref 135–144)
WBC # BLD: 7.3 K/UL (ref 3.5–11)
WBC # BLD: ABNORMAL 10*3/UL

## 2018-12-28 PROCEDURE — 80048 BASIC METABOLIC PNL TOTAL CA: CPT

## 2018-12-28 PROCEDURE — 36415 COLL VENOUS BLD VENIPUNCTURE: CPT

## 2018-12-28 PROCEDURE — 85025 COMPLETE CBC W/AUTO DIFF WBC: CPT

## 2018-12-28 PROCEDURE — P9603 ONE-WAY ALLOW PRORATED MILES: HCPCS

## 2018-12-29 ENCOUNTER — HOSPITAL ENCOUNTER (OUTPATIENT)
Age: 83
Setting detail: SPECIMEN
Discharge: HOME | End: 2018-12-29
Payer: COMMERCIAL

## 2018-12-29 LAB
-: ABNORMAL
AMORPHOUS: ABNORMAL
BACTERIA: ABNORMAL
BILIRUBIN URINE: NEGATIVE
CASTS UA: ABNORMAL /LPF
COLOR: YELLOW
COMMENT UA: ABNORMAL
CRYSTALS, UA: ABNORMAL /HPF
EPITHELIAL CELLS UA: ABNORMAL /HPF (ref 0–5)
GLUCOSE URINE: NEGATIVE
KETONES, URINE: NEGATIVE
LEUKOCYTE ESTERASE, URINE: ABNORMAL
MUCUS: ABNORMAL
NITRITE, URINE: POSITIVE
OTHER OBSERVATIONS UA: ABNORMAL
PH UA: 6 (ref 5–8)
PROTEIN UA: ABNORMAL
RBC UA: ABNORMAL /HPF (ref 0–2)
RENAL EPITHELIAL, UA: ABNORMAL /HPF
SPECIFIC GRAVITY UA: 1.02 (ref 1–1.03)
TRICHOMONAS: ABNORMAL
TURBIDITY: ABNORMAL
URINE HGB: ABNORMAL
UROBILINOGEN, URINE: NORMAL
WBC UA: ABNORMAL /HPF (ref 0–5)
YEAST: ABNORMAL

## 2018-12-29 PROCEDURE — 87086 URINE CULTURE/COLONY COUNT: CPT

## 2018-12-29 PROCEDURE — 81001 URINALYSIS AUTO W/SCOPE: CPT

## 2018-12-31 LAB
CULTURE: NORMAL
Lab: NORMAL
SPECIMEN DESCRIPTION: NORMAL
STATUS: NORMAL

## 2019-01-27 ENCOUNTER — HOSPITAL ENCOUNTER (OUTPATIENT)
Age: 84
Setting detail: SPECIMEN
Discharge: HOME | End: 2019-01-27
Payer: MEDICARE

## 2019-01-27 ENCOUNTER — HOSPITAL ENCOUNTER (OUTPATIENT)
Age: 84
Setting detail: SPECIMEN
End: 2019-01-27
Payer: MEDICARE

## 2019-01-27 LAB
-: ABNORMAL
ABSOLUTE EOS #: 0.2 K/UL (ref 0–0.4)
ABSOLUTE IMMATURE GRANULOCYTE: ABNORMAL K/UL (ref 0–0.3)
ABSOLUTE LYMPH #: 1.4 K/UL (ref 1–4.8)
ABSOLUTE MONO #: 0.6 K/UL (ref 0.2–0.8)
AMORPHOUS: ABNORMAL
ANION GAP SERPL CALCULATED.3IONS-SCNC: 12 MMOL/L (ref 9–17)
BACTERIA: ABNORMAL
BASOPHILS # BLD: 0 % (ref 0–2)
BASOPHILS ABSOLUTE: 0 K/UL (ref 0–0.2)
BILIRUBIN URINE: NEGATIVE
BUN BLDV-MCNC: 36 MG/DL (ref 8–23)
BUN/CREAT BLD: 19 (ref 9–20)
CALCIUM SERPL-MCNC: 8.7 MG/DL (ref 8.6–10.4)
CASTS UA: ABNORMAL /LPF (ref 0–2)
CHLORIDE BLD-SCNC: 105 MMOL/L (ref 98–107)
CHOLESTEROL/HDL RATIO: 1.7
CHOLESTEROL: 67 MG/DL
CO2: 23 MMOL/L (ref 20–31)
COLOR: YELLOW
COMMENT UA: ABNORMAL
CREAT SERPL-MCNC: 1.91 MG/DL (ref 0.7–1.2)
CRYSTALS, UA: ABNORMAL /HPF
DIFFERENTIAL TYPE: ABNORMAL
EOSINOPHILS RELATIVE PERCENT: 3 % (ref 1–4)
EPITHELIAL CELLS UA: ABNORMAL /HPF (ref 0–5)
GFR AFRICAN AMERICAN: 41 ML/MIN
GFR NON-AFRICAN AMERICAN: 34 ML/MIN
GFR SERPL CREATININE-BSD FRML MDRD: ABNORMAL ML/MIN/{1.73_M2}
GFR SERPL CREATININE-BSD FRML MDRD: ABNORMAL ML/MIN/{1.73_M2}
GLUCOSE BLD-MCNC: 90 MG/DL (ref 70–99)
GLUCOSE URINE: ABNORMAL
HCT VFR BLD CALC: 24 % (ref 41–53)
HDLC SERPL-MCNC: 39 MG/DL
HEMOGLOBIN: 8.1 G/DL (ref 13.5–17.5)
IMMATURE GRANULOCYTES: ABNORMAL %
KETONES, URINE: NEGATIVE
LDL CHOLESTEROL: 21 MG/DL (ref 0–130)
LEUKOCYTE ESTERASE, URINE: ABNORMAL
LYMPHOCYTES # BLD: 22 % (ref 24–44)
MCH RBC QN AUTO: 32.2 PG (ref 26–34)
MCHC RBC AUTO-ENTMCNC: 33.9 G/DL (ref 31–37)
MCV RBC AUTO: 95 FL (ref 80–100)
MONOCYTES # BLD: 9 % (ref 1–7)
MUCUS: ABNORMAL
NITRITE, URINE: NEGATIVE
NRBC AUTOMATED: ABNORMAL PER 100 WBC
OTHER OBSERVATIONS UA: ABNORMAL
PDW BLD-RTO: 19.7 % (ref 11.5–14.5)
PH UA: >9 (ref 5–8)
PLATELET # BLD: 85 K/UL (ref 130–400)
PLATELET ESTIMATE: ABNORMAL
PMV BLD AUTO: 9.4 FL (ref 6–12)
POTASSIUM SERPL-SCNC: 4.4 MMOL/L (ref 3.7–5.3)
PROSTATE SPECIFIC ANTIGEN: 4.27 UG/L
PROTEIN UA: ABNORMAL
RBC # BLD: 2.52 M/UL (ref 4.5–5.9)
RBC # BLD: ABNORMAL 10*6/UL
RBC UA: ABNORMAL /HPF (ref 0–2)
RENAL EPITHELIAL, UA: ABNORMAL /HPF
SEG NEUTROPHILS: 66 % (ref 36–66)
SEGMENTED NEUTROPHILS ABSOLUTE COUNT: 4.4 K/UL (ref 1.8–7.7)
SODIUM BLD-SCNC: 140 MMOL/L (ref 135–144)
SPECIFIC GRAVITY UA: 1.02 (ref 1–1.03)
TRICHOMONAS: ABNORMAL
TRIGL SERPL-MCNC: 34 MG/DL
TURBIDITY: ABNORMAL
URINE HGB: ABNORMAL
UROBILINOGEN, URINE: NORMAL
VLDLC SERPL CALC-MCNC: ABNORMAL MG/DL (ref 1–30)
WBC # BLD: 6.6 K/UL (ref 3.5–11)
WBC # BLD: ABNORMAL 10*3/UL
WBC UA: ABNORMAL /HPF (ref 0–5)
YEAST: ABNORMAL

## 2019-01-27 PROCEDURE — 81001 URINALYSIS AUTO W/SCOPE: CPT

## 2019-01-27 PROCEDURE — P9603 ONE-WAY ALLOW PRORATED MILES: HCPCS

## 2019-01-27 PROCEDURE — 87086 URINE CULTURE/COLONY COUNT: CPT

## 2019-01-27 PROCEDURE — 80048 BASIC METABOLIC PNL TOTAL CA: CPT

## 2019-01-27 PROCEDURE — 84153 ASSAY OF PSA TOTAL: CPT

## 2019-01-27 PROCEDURE — 36415 COLL VENOUS BLD VENIPUNCTURE: CPT

## 2019-01-27 PROCEDURE — 85025 COMPLETE CBC W/AUTO DIFF WBC: CPT

## 2019-01-27 PROCEDURE — 87077 CULTURE AEROBIC IDENTIFY: CPT

## 2019-01-27 PROCEDURE — 87186 SC STD MICRODIL/AGAR DIL: CPT

## 2019-01-27 PROCEDURE — 80061 LIPID PANEL: CPT

## 2019-01-30 ENCOUNTER — HOSPITAL ENCOUNTER (OUTPATIENT)
Age: 84
Setting detail: SPECIMEN
Discharge: HOME | End: 2019-01-30
Payer: MEDICARE

## 2019-01-30 LAB
ABSOLUTE EOS #: 0.07 K/UL (ref 0–0.44)
ABSOLUTE IMMATURE GRANULOCYTE: 0.04 K/UL (ref 0–0.3)
ABSOLUTE LYMPH #: 1.24 K/UL (ref 1.1–3.7)
ABSOLUTE MONO #: 0.46 K/UL (ref 0.1–1.2)
ANION GAP SERPL CALCULATED.3IONS-SCNC: 7 MMOL/L (ref 9–17)
BASOPHILS # BLD: 0 % (ref 0–2)
BASOPHILS ABSOLUTE: <0.03 K/UL (ref 0–0.2)
BUN BLDV-MCNC: 40 MG/DL (ref 8–23)
BUN/CREAT BLD: ABNORMAL (ref 9–20)
CALCIUM SERPL-MCNC: 8.8 MG/DL (ref 8.6–10.4)
CHLORIDE BLD-SCNC: 112 MMOL/L (ref 98–107)
CHOLESTEROL/HDL RATIO: 1.7
CHOLESTEROL: 62 MG/DL
CO2: 25 MMOL/L (ref 20–31)
CREAT SERPL-MCNC: 2.05 MG/DL (ref 0.7–1.2)
CULTURE: ABNORMAL
CULTURE: ABNORMAL
DIFFERENTIAL TYPE: ABNORMAL
EOSINOPHILS RELATIVE PERCENT: 1 % (ref 1–4)
GFR AFRICAN AMERICAN: 38 ML/MIN
GFR NON-AFRICAN AMERICAN: 31 ML/MIN
GFR SERPL CREATININE-BSD FRML MDRD: ABNORMAL ML/MIN/{1.73_M2}
GFR SERPL CREATININE-BSD FRML MDRD: ABNORMAL ML/MIN/{1.73_M2}
GLUCOSE BLD-MCNC: 101 MG/DL (ref 70–99)
HCT VFR BLD CALC: 25.7 % (ref 40.7–50.3)
HDLC SERPL-MCNC: 36 MG/DL
HEMOGLOBIN: 7.9 G/DL (ref 13–17)
IMMATURE GRANULOCYTES: 1 %
LDL CHOLESTEROL: 21 MG/DL (ref 0–130)
LYMPHOCYTES # BLD: 26 % (ref 24–43)
Lab: ABNORMAL
MCH RBC QN AUTO: 31.9 PG (ref 25.2–33.5)
MCHC RBC AUTO-ENTMCNC: 30.7 G/DL (ref 28.4–34.8)
MCV RBC AUTO: 103.6 FL (ref 82.6–102.9)
MONOCYTES # BLD: 10 % (ref 3–12)
NRBC AUTOMATED: 0 PER 100 WBC
ORGANISM: ABNORMAL
ORGANISM: ABNORMAL
PDW BLD-RTO: 17.5 % (ref 11.8–14.4)
PLATELET # BLD: ABNORMAL K/UL (ref 138–453)
PLATELET ESTIMATE: ABNORMAL
PLATELET, FLUORESCENCE: 92 K/UL (ref 138–453)
PLATELET, IMMATURE FRACTION: 6.7 % (ref 1.1–10.3)
PMV BLD AUTO: ABNORMAL FL (ref 8.1–13.5)
POTASSIUM SERPL-SCNC: 5.1 MMOL/L (ref 3.7–5.3)
PROSTATE SPECIFIC ANTIGEN: 3.06 UG/L
RBC # BLD: 2.48 M/UL (ref 4.21–5.77)
RBC # BLD: ABNORMAL 10*6/UL
SEG NEUTROPHILS: 63 % (ref 36–65)
SEGMENTED NEUTROPHILS ABSOLUTE COUNT: 3.04 K/UL (ref 1.5–8.1)
SODIUM BLD-SCNC: 144 MMOL/L (ref 135–144)
SPECIMEN DESCRIPTION: ABNORMAL
STATUS: ABNORMAL
TRIGL SERPL-MCNC: 25 MG/DL
URIC ACID: 5 MG/DL (ref 3.4–7)
VITAMIN D 25-HYDROXY: 33.5 NG/ML (ref 30–100)
VLDLC SERPL CALC-MCNC: ABNORMAL MG/DL (ref 1–30)
WBC # BLD: 4.9 K/UL (ref 3.5–11.3)
WBC # BLD: ABNORMAL 10*3/UL

## 2019-01-30 PROCEDURE — 85025 COMPLETE CBC W/AUTO DIFF WBC: CPT

## 2019-01-30 PROCEDURE — 85055 RETICULATED PLATELET ASSAY: CPT

## 2019-01-30 PROCEDURE — 36415 COLL VENOUS BLD VENIPUNCTURE: CPT

## 2019-01-30 PROCEDURE — 84550 ASSAY OF BLOOD/URIC ACID: CPT

## 2019-01-30 PROCEDURE — P9603 ONE-WAY ALLOW PRORATED MILES: HCPCS

## 2019-01-30 PROCEDURE — 82306 VITAMIN D 25 HYDROXY: CPT

## 2019-01-30 PROCEDURE — 80048 BASIC METABOLIC PNL TOTAL CA: CPT

## 2019-01-30 PROCEDURE — 80061 LIPID PANEL: CPT

## 2019-01-30 PROCEDURE — 84153 ASSAY OF PSA TOTAL: CPT

## 2019-02-06 ENCOUNTER — HOSPITAL ENCOUNTER (OUTPATIENT)
Age: 84
Setting detail: SPECIMEN
Discharge: HOME | End: 2019-02-06
Payer: MEDICARE

## 2019-02-06 LAB
ABSOLUTE EOS #: 0.08 K/UL (ref 0–0.44)
ABSOLUTE IMMATURE GRANULOCYTE: <0.03 K/UL (ref 0–0.3)
ABSOLUTE LYMPH #: 1.24 K/UL (ref 1.1–3.7)
ABSOLUTE MONO #: 0.33 K/UL (ref 0.1–1.2)
BASOPHILS # BLD: 0 % (ref 0–2)
BASOPHILS ABSOLUTE: <0.03 K/UL (ref 0–0.2)
DIFFERENTIAL TYPE: ABNORMAL
EOSINOPHILS RELATIVE PERCENT: 2 % (ref 1–4)
HCT VFR BLD CALC: 24.1 % (ref 40.7–50.3)
HEMOGLOBIN: 7.6 G/DL (ref 13–17)
IMMATURE GRANULOCYTES: 0 %
LYMPHOCYTES # BLD: 25 % (ref 24–43)
MCH RBC QN AUTO: 31.3 PG (ref 25.2–33.5)
MCHC RBC AUTO-ENTMCNC: 31.5 G/DL (ref 28.4–34.8)
MCV RBC AUTO: 99.2 FL (ref 82.6–102.9)
MONOCYTES # BLD: 7 % (ref 3–12)
NRBC AUTOMATED: 0 PER 100 WBC
PDW BLD-RTO: 17.1 % (ref 11.8–14.4)
PLATELET # BLD: 107 K/UL (ref 138–453)
PLATELET ESTIMATE: ABNORMAL
PMV BLD AUTO: 12.5 FL (ref 8.1–13.5)
RBC # BLD: 2.43 M/UL (ref 4.21–5.77)
RBC # BLD: ABNORMAL 10*6/UL
SEG NEUTROPHILS: 66 % (ref 36–65)
SEGMENTED NEUTROPHILS ABSOLUTE COUNT: 3.28 K/UL (ref 1.5–8.1)
WBC # BLD: 5 K/UL (ref 3.5–11.3)
WBC # BLD: ABNORMAL 10*3/UL

## 2019-02-06 PROCEDURE — P9603 ONE-WAY ALLOW PRORATED MILES: HCPCS

## 2019-02-06 PROCEDURE — 85025 COMPLETE CBC W/AUTO DIFF WBC: CPT

## 2019-02-06 PROCEDURE — 36415 COLL VENOUS BLD VENIPUNCTURE: CPT
